# Patient Record
Sex: MALE | Race: WHITE | NOT HISPANIC OR LATINO | Employment: OTHER | URBAN - METROPOLITAN AREA
[De-identification: names, ages, dates, MRNs, and addresses within clinical notes are randomized per-mention and may not be internally consistent; named-entity substitution may affect disease eponyms.]

---

## 2023-02-19 ENCOUNTER — OFFICE VISIT (OUTPATIENT)
Dept: URGENT CARE | Facility: CLINIC | Age: 33
End: 2023-02-19
Payer: COMMERCIAL

## 2023-02-19 VITALS
OXYGEN SATURATION: 97 % | HEIGHT: 72 IN | HEART RATE: 103 BPM | SYSTOLIC BLOOD PRESSURE: 147 MMHG | RESPIRATION RATE: 18 BRPM | DIASTOLIC BLOOD PRESSURE: 87 MMHG | BODY MASS INDEX: 28.44 KG/M2 | TEMPERATURE: 99 F | WEIGHT: 210 LBS

## 2023-02-19 DIAGNOSIS — S99.912A INJURY OF LEFT ANKLE, INITIAL ENCOUNTER: ICD-10-CM

## 2023-02-19 DIAGNOSIS — S93.402A SPRAIN OF LEFT ANKLE, UNSPECIFIED LIGAMENT, INITIAL ENCOUNTER: Primary | ICD-10-CM

## 2023-02-19 PROCEDURE — 1160F PR REVIEW ALL MEDS BY PRESCRIBER/CLIN PHARMACIST DOCUMENTED: ICD-10-PCS | Mod: CPTII,S$GLB,, | Performed by: NURSE PRACTITIONER

## 2023-02-19 PROCEDURE — 96372 PR INJECTION,THERAP/PROPH/DIAG2ST, IM OR SUBCUT: ICD-10-PCS | Mod: S$GLB,,, | Performed by: NURSE PRACTITIONER

## 2023-02-19 PROCEDURE — 1159F MED LIST DOCD IN RCRD: CPT | Mod: CPTII,S$GLB,, | Performed by: NURSE PRACTITIONER

## 2023-02-19 PROCEDURE — 99203 OFFICE O/P NEW LOW 30 MIN: CPT | Mod: 25,S$GLB,, | Performed by: NURSE PRACTITIONER

## 2023-02-19 PROCEDURE — 96372 THER/PROPH/DIAG INJ SC/IM: CPT | Mod: S$GLB,,, | Performed by: NURSE PRACTITIONER

## 2023-02-19 PROCEDURE — 3077F SYST BP >= 140 MM HG: CPT | Mod: CPTII,S$GLB,, | Performed by: NURSE PRACTITIONER

## 2023-02-19 PROCEDURE — 1160F RVW MEDS BY RX/DR IN RCRD: CPT | Mod: CPTII,S$GLB,, | Performed by: NURSE PRACTITIONER

## 2023-02-19 PROCEDURE — 1159F PR MEDICATION LIST DOCUMENTED IN MEDICAL RECORD: ICD-10-PCS | Mod: CPTII,S$GLB,, | Performed by: NURSE PRACTITIONER

## 2023-02-19 PROCEDURE — 99203 PR OFFICE/OUTPT VISIT, NEW, LEVL III, 30-44 MIN: ICD-10-PCS | Mod: 25,S$GLB,, | Performed by: NURSE PRACTITIONER

## 2023-02-19 PROCEDURE — 3079F DIAST BP 80-89 MM HG: CPT | Mod: CPTII,S$GLB,, | Performed by: NURSE PRACTITIONER

## 2023-02-19 PROCEDURE — 3077F PR MOST RECENT SYSTOLIC BLOOD PRESSURE >= 140 MM HG: ICD-10-PCS | Mod: CPTII,S$GLB,, | Performed by: NURSE PRACTITIONER

## 2023-02-19 PROCEDURE — 3079F PR MOST RECENT DIASTOLIC BLOOD PRESSURE 80-89 MM HG: ICD-10-PCS | Mod: CPTII,S$GLB,, | Performed by: NURSE PRACTITIONER

## 2023-02-19 PROCEDURE — 3008F PR BODY MASS INDEX (BMI) DOCUMENTED: ICD-10-PCS | Mod: CPTII,S$GLB,, | Performed by: NURSE PRACTITIONER

## 2023-02-19 PROCEDURE — 73610 XR ANKLE COMPLETE 3 VIEW LEFT: ICD-10-PCS | Mod: LT,S$GLB,, | Performed by: RADIOLOGY

## 2023-02-19 PROCEDURE — 73610 X-RAY EXAM OF ANKLE: CPT | Mod: LT,S$GLB,, | Performed by: RADIOLOGY

## 2023-02-19 PROCEDURE — 3008F BODY MASS INDEX DOCD: CPT | Mod: CPTII,S$GLB,, | Performed by: NURSE PRACTITIONER

## 2023-02-19 RX ORDER — KETOROLAC TROMETHAMINE 30 MG/ML
30 INJECTION, SOLUTION INTRAMUSCULAR; INTRAVENOUS
Status: COMPLETED | OUTPATIENT
Start: 2023-02-19 | End: 2023-02-19

## 2023-02-19 RX ORDER — IBUPROFEN 600 MG/1
600 TABLET ORAL EVERY 6 HOURS PRN
Qty: 30 TABLET | Refills: 0 | Status: SHIPPED | OUTPATIENT
Start: 2023-02-19 | End: 2023-02-27

## 2023-02-19 RX ADMIN — KETOROLAC TROMETHAMINE 30 MG: 30 INJECTION, SOLUTION INTRAMUSCULAR; INTRAVENOUS at 04:02

## 2023-02-19 NOTE — PATIENT INSTRUCTIONS
- Follow up with your PCP or specialty clinic as directed in the next 1-2 weeks if not improved or as needed.  You can call [phone] to schedule an appointment with the appropriate provider.    - Go to the ER or seek medical attention immediately if you develop new or worsening symptoms.    - You must understand that you have received an Urgent Care treatment only and that you may be released before all of your medical problems are known or treated.   - You, the patient, will arrange for follow up care as instructed.   - If your condition worsens or fails to improve we recommend that you receive another evaluation at the ER immediately or contact your PCP to discuss your concerns or return here.

## 2023-02-19 NOTE — PROGRESS NOTES
Subjective:       Patient ID: Charles Finn is a 32 y.o. male.    Vitals:  height is 6' (1.829 m) and weight is 95.3 kg (210 lb). His tympanic temperature is 99.2 °F (37.3 °C). His blood pressure is 147/87 (abnormal) and his pulse is 103. His respiration is 18 and oxygen saturation is 97%.     Chief Complaint: Ankle Pain    32-year-old male presents to clinic for evaluation of left ankle pain and swelling after twisting his ankle earlier today.  Patient states that he was stepping off of a boat, when he twisted his left ankle.  He states he felt a popping sensation to the outside of his left ankle.  He reports 6/10 pain.  He reports taking 2 Tylenol prior to arrival.  He is ambulatory, however he reports increased pain with ambulation.  He is awake and alert, answers questions appropriately, no acute distress noted on today's visit.    Ankle Pain   The incident occurred 1 to 3 hours ago. The incident occurred at home. The injury mechanism was a twisting injury. The pain is present in the left ankle. The quality of the pain is described as aching. The pain is at a severity of 6/10. The pain is moderate. The pain has been Constant since onset. Associated symptoms include an inability to bear weight. Pertinent negatives include no numbness. He reports no foreign bodies present. The symptoms are aggravated by movement and weight bearing. He has tried nothing for the symptoms.     Constitution: Negative for activity change, appetite change, chills, sweating, fatigue and fever.   Cardiovascular:  Negative for chest pain.   Respiratory:  Negative for shortness of breath.    Gastrointestinal:  Negative for abdominal pain.   Musculoskeletal:  Positive for pain, trauma, joint pain, joint swelling and abnormal ROM of joint.   Skin:  Negative for erythema.   Neurological:  Negative for dizziness, passing out, numbness and tingling.     Objective:      Physical Exam   Constitutional: He is oriented to person, place, and time.  He appears well-developed.  Non-toxic appearance. He does not appear ill. No distress.   HENT:   Head: Normocephalic and atraumatic. Head is without abrasion, without contusion and without laceration.   Ears:   Right Ear: External ear normal.   Left Ear: External ear normal.   Mouth/Throat: Oropharynx is clear and moist and mucous membranes are normal.   Eyes: Conjunctivae, EOM and lids are normal. Left eye exhibits no discharge.   Neck: Trachea normal and phonation normal.   Cardiovascular: Normal rate.   Pulmonary/Chest: Effort normal. No respiratory distress.   Abdominal: Normal appearance.   Musculoskeletal:         General: Swelling, tenderness and signs of injury present. No deformity.      Left ankle: He exhibits decreased range of motion and swelling. Tenderness. Lateral malleolus tenderness found.        Legs:    Neurological: He is alert and oriented to person, place, and time.   Skin: Skin is warm, intact, not diaphoretic and no rash. Capillary refill takes less than 2 seconds. No abrasion, No burn, No bruising, No erythema and No ecchymosis   Psychiatric: His speech is normal and behavior is normal. Mood, judgment and thought content normal.   Nursing note and vitals reviewed.    XR ANKLE COMPLETE 3 VIEW LEFT    Result Date: 2/19/2023  EXAMINATION: XR ANKLE COMPLETE 3 VIEW LEFT CLINICAL HISTORY: Unspecified injury of left ankle, initial encounter TECHNIQUE: AP, lateral and oblique views of the left ankle were performed. COMPARISON: None FINDINGS: Three views left ankle. No acute displaced fracture or dislocation of the ankle.  No radiopaque foreign body.  The ankle mortise is intact.  Edema overlies the lateral malleolus.     1. No acute displaced fracture or dislocation of the ankle. Electronically signed by: Ray Ortega MD Date:    02/19/2023 Time:    16:27     Assessment:       1. Sprain of left ankle, unspecified ligament, initial encounter    2. Injury of left ankle, initial encounter           Plan:         Sprain of left ankle, unspecified ligament, initial encounter  -     GEL ANKLE SPLINT FOR HOME USE  -     Ambulatory referral/consult to Orthopedics    Injury of left ankle, initial encounter  -     XR ANKLE COMPLETE 3 VIEW LEFT; Future; Expected date: 02/19/2023  -     ketorolac injection 30 mg  -     GEL ANKLE SPLINT FOR HOME USE  -     Ambulatory referral/consult to Orthopedics  -     ibuprofen (ADVIL,MOTRIN) 600 MG tablet; Take 1 tablet (600 mg total) by mouth every 6 (six) hours as needed for Pain.  Dispense: 30 tablet; Refill: 0    - no acute fracture dislocation seen on x-ray of left ankle.  Ace wrap applied in clinic with air splint.  Patient declined crutches on today's visit.  Referral placed to orthopedics for further evaluation of left ankle injury.  Discussed rest, ice, compression, elevation, and pain control with anti-inflammatories alternating with Tylenol.  Follow-up with PCP.  Patient verbalized understanding and is in agreement with plan.    Patient Instructions   - Follow up with your PCP or specialty clinic as directed in the next 1-2 weeks if not improved or as needed.  You can call [phone] to schedule an appointment with the appropriate provider.    - Go to the ER or seek medical attention immediately if you develop new or worsening symptoms.    - You must understand that you have received an Urgent Care treatment only and that you may be released before all of your medical problems are known or treated.   - You, the patient, will arrange for follow up care as instructed.   - If your condition worsens or fails to improve we recommend that you receive another evaluation at the ER immediately or contact your PCP to discuss your concerns or return here.